# Patient Record
Sex: FEMALE | Employment: OTHER | URBAN - NONMETROPOLITAN AREA
[De-identification: names, ages, dates, MRNs, and addresses within clinical notes are randomized per-mention and may not be internally consistent; named-entity substitution may affect disease eponyms.]

---

## 2023-10-24 PROBLEM — R00.0 TACHYCARDIA: Status: ACTIVE | Noted: 2023-10-24

## 2023-10-24 PROBLEM — E05.00 GRAVES DISEASE: Status: ACTIVE | Noted: 2023-10-24

## 2023-10-24 PROBLEM — R63.4 WEIGHT LOSS: Status: ACTIVE | Noted: 2023-10-24

## 2023-10-24 PROBLEM — L29.9 ITCHING: Status: ACTIVE | Noted: 2023-10-24

## 2023-11-09 ENCOUNTER — TELEPHONE (OUTPATIENT)
Dept: PULMONOLOGY | Age: 83
End: 2023-11-09

## 2023-11-09 NOTE — TELEPHONE ENCOUNTER
Kirsten avlentine called and gave a name and number of patient needing Iodine radiation tx. Her at [facility]. Not knowing who and were the procedure is to be done . I called Dr. Angelica Esteban office at [facility] to get a better understanding of what the patient needed done. Upon researching the chart I found the order and spoke with the patient also to clarify her order and needs and she reported Chano Palma would be doing the tx. So I called nuclear med and cardiopulmonary and they indicated [facility]ano Palma is on Medical Leave and Kiesha would be doing it . I proceeded to ask Ja Quiroz to make sure Kiesha is aware of the order and get back with the patient. I proceeded to call the patient back and informed her of the situation and if she doesn't hear from someone from the Nuclear med. Department to call me back on Monday and I would f/u with it to assist her. The patient verbalized understanding.

## 2023-11-20 ENCOUNTER — TRANSCRIBE ORDERS (OUTPATIENT)
Dept: ADMINISTRATIVE | Age: 83
End: 2023-11-20

## 2023-11-20 DIAGNOSIS — E05.00 GRAVES DISEASE: Primary | ICD-10-CM

## 2023-11-29 ENCOUNTER — HOSPITAL ENCOUNTER (OUTPATIENT)
Dept: NUCLEAR MEDICINE | Age: 83
Discharge: HOME | End: 2023-12-01
Payer: MEDICARE

## 2023-11-29 DIAGNOSIS — E05.00 GRAVES DISEASE: ICD-10-CM

## 2023-11-29 PROCEDURE — 79005 NUCLEAR RX ORAL ADMIN: CPT

## 2023-11-29 RX ADMIN — Medication 20000 MICRO CURIE: at 10:05
